# Patient Record
(demographics unavailable — no encounter records)

---

## 2024-12-13 NOTE — HISTORY OF PRESENT ILLNESS
[FreeTextEntry1] : The chief complaint   the father reports that the patient for the past 3 years had history of fever for 2 to 3 weeks at a time during the winter months.  The history dated back to 2 to 3 years ago. During the winter months, patient would have history of protracted cough, sometimes associated with possible wheezing, but the father is more concerned because patient would have often 2 to 3 weeks of high fever without any definite etiology being identified.(no history of recurrent pharyngitis, aphthous ulcers, adenitis) No definite pneumonia, recurrent otitis and recurrent sinusitis.  Interesting, he had no such episodes in the summer seasons. He is a very active sports participant without much issues during the summer months.  The father started having custody of the child in 2023, patient moved to live in [place] with the father in 2023.  and according to the father patient experienced similar episode that winter while living in [facility].  There is a history of fever although the patient can still attend school.  So far this year (2024)  this pattern has not started yet and the father want to be able to prevent similar cycle of events  Patient had an allergy evaluation by Dr. Kati Shrestha on [place] which is unrevealing for any definite environmental allergen  Patient did not have any chest x-ray done  There is extensive traveling history to [place], multiple countries.  There is history of living in [place].  However no similar pattern was elicited  Father did not recall any other workup or consultation except that the patient has had skin rashes on the hands during wintertime, father denied any definite Raynaud's phenomena or cold induced urticaria symptoms.  He is not sure that there was seen by a dermatologist.   [Snoring] : snoring [Fever] : fever [Sweating Heavily At Night] : night sweats [Nonspecific Pain, Swelling, And Stiffness] : pain [Feelings Of Weakness On Exertion] : exercise intolerance [Coughing Up Sputum] : sputum production [Coughing Up Blood (Hemoptysis)] : hemoptysis

## 2024-12-13 NOTE — SOCIAL HISTORY
[Father] : father [de-identified] : There is an older brother in the house [None] : none [Smokers in Household] : there are no smokers in the home

## 2024-12-13 NOTE — ASSESSMENT
[FreeTextEntry1] : Discussed with the father that this seasonal prolonged cough and possible wheezing pattern is compatible with asthma.  Patient has associated features of paternal childhood asthma, and eczema of the hand.  At this time we are trying to see if the patient has repeated episodes of fever during the wintertime.  We are trying to obtain other medical records that might be retrieved during the past few years   spirometry is performed to assess the patient for progress/ evaluation  of baseline asthma (per national asthma management guidelines) result: normal /  exhaled nitrous oxide is performed to assess allergy/ inflammation  result:   <20         (   normal <20, 20-35 likely TH2 driven inflammation >35 significant Th2   driven inflammation ) d/w guardian above results continue to monitor progress continue treatment plan: to treat as mild persistent asthma during the winter season  asthma education  was reinforced: # referral for educator # pathology of disease,  use of inhaler   +  spacer   + mask;       technique is checked :  # trigger control;  environmental control avoidance tobacco and all other smoking compliance d/w importance of compliance and danger of non adherence # ;Action plan,  [facility] school # d/w s/e of Rx:   psychological s/e of montelukast, adult height reduction etc of ICS # CDC recommended vaccines discussed

## 2024-12-13 NOTE — PHYSICAL EXAM
[Well Nourished] : well nourished [Well Developed] : well developed [Alert] : ~L alert [Active] : active [Normal Breathing Pattern] : normal breathing pattern [No Respiratory Distress] : no respiratory distress [No Drainage] : no drainage [No Conjunctivitis] : no conjunctivitis [Tympanic Membranes Clear] : tympanic membranes were clear [No Nasal Drainage] : no nasal drainage [No Polyps] : no polyps [No Sinus Tenderness] : no sinus tenderness [No Oral Pallor] : no oral pallor [No Oral Cyanosis] : no oral cyanosis [Non-Erythematous] : non-erythematous [No Exudates] : no exudates [No Postnasal Drip] : no postnasal drip [No Tonsillar Enlargement] : no tonsillar enlargement [Absence Of Retractions] : absence of retractions [Symmetric] : symmetric [Good Expansion] : good expansion [No Acc Muscle Use] : no accessory muscle use [Good aeration to bases] : good aeration to bases [Equal Breath Sounds] : equal breath sounds bilaterally [No Crackles] : no crackles [No Rhonchi] : no rhonchi [No Wheezing] : no wheezing [Normal Sinus Rhythm] : normal sinus rhythm [No Heart Murmur] : no heart murmur [Soft, Non-Tender] : soft, non-tender [No Hepatosplenomegaly] : no hepatosplenomegaly [Non Distended] : was not ~L distended [Abdomen Mass (___ Cm)] : no abdominal mass palpated [Full ROM] : full range of motion [No Clubbing] : no clubbing [Capillary Refill < 2 secs] : capillary refill less than two seconds [No Cyanosis] : no cyanosis [No Petechiae] : no petechiae [No Kyphoscoliosis] : no kyphoscoliosis [No Contractures] : no contractures [Alert and  Oriented] : alert and oriented [No Abnormal Focal Findings] : no abnormal focal findings [Normal Muscle Tone And Reflexes] : normal muscle tone and reflexes [No Birth Marks] : no birth marks [No Rashes] : no rashes [No Skin Lesions] : no skin lesions

## 2025-02-12 NOTE — ASSESSMENT
[FreeTextEntry1] : d/w dad diarrhea and vomiting probably 2 to AGE and not 2 to fluticasone re start fluticasone 6 weeks history of increase in atopic dermatitis and cough and wheezing  Problems and Plans ### persistent asthma: daily controller ### exercise asthma : albuterol prior ### allergic rhinitis: nasal steroid Rx, 3 rd gen antihistamine ### eczema: topical steroid prn  40 minutes spent >50% in coordination of care/ counselling

## 2025-02-12 NOTE — HISTORY OF PRESENT ILLNESS
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